# Patient Record
(demographics unavailable — no encounter records)

---

## 2024-12-09 NOTE — PROCEDURE
[FreeTextEntry1] : [facility] [FreeTextEntry2] : lymphedema monitoring.  [FreeTextEntry3] : The patient had a follow up SOZO measurement which I reviewed today. The score is within normal limits, see flowsheet. Bioimpedance spectroscopy helps identify the onset of lymphedema in an arm or leg before patients experience noticeable swelling. Research has shown that the early detection of lymphedema using L-Dex combined with treatment can reduce progression to chronic lymphedema by 95% in breast cancer patients. Whenever possible, patients are tested for baseline L-Dex score before cancer treatment begins and then are reassessed during regular follow-up visits using the SOZO device. Otherwise, this can be started postoperatively and continued during regular follow-up visits. If the patients L-Dex score increases above normal levels, that is a sign that lymphedema is developing and a referral is made to physical therapy for further evaluation and early compression treatment. Lymphedema assessment with the SOZO L-Dex score is recommended to be done every 3 months for the first 3 years and then every 6 months for years 4 and 5 followed by annually afterwards.

## 2024-12-09 NOTE — PHYSICAL EXAM
[Normocephalic] : normocephalic [Atraumatic] : atraumatic [Supple] : supple [No Supraclavicular Adenopathy] : no supraclavicular adenopathy [No Thyromegaly] : no thyromegaly [Examined in the supine and seated position] : examined in the supine and seated position [Bra Size: ___] : Bra Size: [unfilled] [No dominant masses] : no dominant masses in right breast  [No dominant masses] : no dominant masses left breast [No Nipple Retraction] : no left nipple retraction [No Nipple Discharge] : no left nipple discharge [No Axillary Lymphadenopathy] : no left axillary lymphadenopathy [No Edema] : no edema [No Rashes] : no rashes [No Ulceration] : no ulceration [de-identified] :  Bilateral oncoplastic scars healed well, s/p R lumpectomy. R axillary incision healed well.  Upper outer breast lumpiness referred by pt is normal breast tissue and edge of tissue rotated for oncoplastic. No suspicious masses or lesions.

## 2024-12-09 NOTE — ASSESSMENT
[FreeTextEntry1] : PCP: Aida Contreras, DO  Patient is a 66-year-old female, MYRIAD negative, here today for follow up. She is s/p 11/10/23 R lumpectomy and SLNBx and oncoplastics per Dr. 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 [id number] CISH negative. Myriad negative. 11/10/23 Surgical path: R- IDC 10mm, Gr1-2, greater than 2mm from all margins. DCIS 1.5mm from lateral and medial margin, -EIC. ALH. Bx site change. 0/5 LN negative. ER %, [place]+ 71-80%, [id number] CISH negative. *R breast skin tag- intradermal melanocytic nevus. 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 pT1bN0/5 Pt doing well, without breast complaints. Wondering about lumpiness in upper out breast.   Oncotype Dx recurrence score resulted at 6, <1% CT benefit, started Anastrozole after EBRT with Dr. Alicea. Completed EBRT with Dr. Ochoa in 1/18/24. Insurance would not cover Veozah (for hot flashes).  11/5/2024 NFR bilat dMMG: Scattered FG density. No susp mass, microcals, or other signs of malignancy is identified. Stable bilat postsurgical changes. MYRTLE-2. Imp- no mmg evidence of malignancy. Rec-mmg in 1 yr. BR2.  Fhx: No known family history, not AJ.  CBE: C cup, Bilateral oncoplastic incisions healed well, R lumpectomy. R axillary incision healed well. No lymphedema, great range of motion.  Reviewed no suspicious findings on annual smmg (FG) or CBE. Next mmg due 11/25.  Sozo was negative.   PLAN: F.u 6 mos. F/u with Reymundo Ochoa and Nixon as scheduled. Appt with Dr. Seth for exercise oncology.  SOZO today normal.

## 2024-12-09 NOTE — PHYSICAL EXAM
[Normocephalic] : normocephalic [Atraumatic] : atraumatic [Supple] : supple [No Supraclavicular Adenopathy] : no supraclavicular adenopathy [No Thyromegaly] : no thyromegaly [Examined in the supine and seated position] : examined in the supine and seated position [Bra Size: ___] : Bra Size: [unfilled] [No dominant masses] : no dominant masses in right breast  [No dominant masses] : no dominant masses left breast [No Nipple Retraction] : no left nipple retraction [No Nipple Discharge] : no left nipple discharge [No Axillary Lymphadenopathy] : no left axillary lymphadenopathy [No Edema] : no edema [No Rashes] : no rashes [No Ulceration] : no ulceration [de-identified] :  Bilateral oncoplastic scars healed well, s/p R lumpectomy. R axillary incision healed well.  Upper outer breast lumpiness referred by pt is normal breast tissue and edge of tissue rotated for oncoplastic. No suspicious masses or lesions.

## 2024-12-09 NOTE — CONSULT LETTER
[Dear  ___] : Dear  [unfilled], [Courtesy Letter:] : I had the pleasure of seeing your patient, [unfilled], in my office today. [Please see my note below.] : Please see my note below. [Sincerely,] : Sincerely, [FreeTextEntry3] : Maria R Whiting MD

## 2024-12-09 NOTE — DATA REVIEWED
[FreeTextEntry1] : 11/5/2024 NFR bilat dMMG: Scattered FG density. No susp mass, microcals, or other signs of malignancy is identified. Stable bilat postsurgical changes. MYRTLE-2. Imp- no mmg evidence of malignancy. Rec-mmg in 1 yr. BR2.

## 2024-12-09 NOTE — PROCEDURE
[FreeTextEntry1] : [facility] [FreeTextEntry2] : lymphedema monitoring.  [FreeTextEntry3] : The patient had a follow up SOZO measurement which I reviewed today. The score is within normal limits, see flowsheet. Bioimpedance spectroscopy helps identify the onset of lymphedema in an arm or leg before patients experience noticeable swelling. Research has shown that the early detection of lymphedema using L-Dex combined with treatment can reduce progression to chronic lymphedema by 95% in breast cancer patients. Whenever possible, patients are tested for baseline L-Dex score before cancer treatment begins and then are reassessed during regular follow-up visits using the SOZO device. Otherwise, this can be started postoperatively and continued during regular follow-up visits. If the patients L-Dex score increases above normal levels, that is a sign that lymphedema is developing and a referral is made to physical therapy for further evaluation and early compression treatment. Lymphedema assessment with the SOZO L-Dex score is recommended to be done every 3 months for the first 3 years and then every 6 months for years 4 and 5 followed by annually afterwards.

## 2024-12-09 NOTE — PAST MEDICAL HISTORY
[Menarche Age ____] : age at menarche was [unfilled] [Menopause Age____] : age at menopause was [unfilled] [Approximately ___] : the LMP was approximately [unfilled] [Total Preg ___] : G[unfilled] [Living ___] : Living: [unfilled] [Age At Live Birth ___] : Age at live birth: [unfilled] [History of Hormone Replacement Treatment] : has no history of hormone replacement treatment [FreeTextEntry7] : yes until age 28 [FreeTextEntry8] : yes8 months, 6 months, 4 months.

## 2024-12-09 NOTE — HISTORY OF PRESENT ILLNESS
[FreeTextEntry1] : PCP: Adia Contreras DO  Patient is a 66-year-old female, MYRIAD negative, here today for follow up. She is s/p 11/10/23 R lumpectomy and SLNBx and oncoplastics per Dr. 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 [id number] CISH negative. Myriad negative. 11/10/23 Surgical path: R- IDC 10mm, Gr1-2, greater than 2mm from all margins. DCIS 1.5mm from lateral and medial margin, -EIC. ALH. Bx site change. 0/5 LN negative. ER %, [place]+ 71-80%, [id number] CISH negative. *R breast skin tag- intradermal melanocytic nevus. 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 pT1bN0/5 Pt doing well, without breast complaints.   Oncotype Dx recurrence score resulted at 6, <1% CT benefit, started Anastrozole after EBRT with Dr. Alicea. Completed EBRT with Dr. Ochoa in 1/18/24. Insurance would not cover Veozah (for hot flashes).  5/14/24 NFR, bilat dMMG: Scattered FG. No susp mass, microcals or other sign of malignancy is identified. Bilateral postsurgical changes. MYRTLE- grade 2. Impression: no mmg evidence of malignancy. Rec mmg in 1yr. BR2.  11/5/2024 NFR bilat dMMG: Scattered FG density. No susp mass, microcals, or other signs of malignancy is identified. Stable bilat postsurgical changes. MYRTLE-2. Imp- no mmg evidence of malignancy. Rec-mmg in 1 yr. BR2.  Fhx: No known family history, not AJ.

## 2024-12-09 NOTE — ASSESSMENT
[FreeTextEntry1] : PCP: Aida Contreras, DO  Patient is a 66-year-old female, MYRIAD negative, here today for follow up. She is s/p 11/10/23 R lumpectomy and SLNBx and oncoplastics per Dr. 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 [id number] CISH negative. Myriad negative. 11/10/23 Surgical path: R- IDC 10mm, Gr1-2, greater than 2mm from all margins. DCIS 1.5mm from lateral and medial margin, -EIC. ALH. Bx site change. 0/5 LN negative. ER %, NE+ 71-80%, [id number] CISH negative. *R breast skin tag- intradermal melanocytic nevus. 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 pT1bN0/5 Pt doing well, without breast complaints. Wondering about lumpiness in upper out breast.   Oncotype Dx recurrence score resulted at 6, <1% CT benefit, started Anastrozole after EBRT with Dr. Alicea. Completed EBRT with Dr. Ochoa in 1/18/24. Insurance would not cover Veozah (for hot flashes).  11/5/2024 NFR bilat dMMG: Scattered FG density. No susp mass, microcals, or other signs of malignancy is identified. Stable bilat postsurgical changes. MYRTLE-2. Imp- no mmg evidence of malignancy. Rec-mmg in 1 yr. BR2.  Fhx: No known family history, not AJ.  CBE: C cup, Bilateral oncoplastic incisions healed well, R lumpectomy. R axillary incision healed well. No lymphedema, great range of motion.  Reviewed no suspicious findings on annual smmg (FG) or CBE. Next mmg due 11/25.  Sozo was negative.   PLAN: F.u 6 mos. F/u with Reymundo Ochoa and Nixon as scheduled. Appt with Dr. Seth for exercise oncology.  SOZO today normal.

## 2024-12-09 NOTE — HISTORY OF PRESENT ILLNESS
[FreeTextEntry1] : PCP: Aida Contreras DO  Patient is a 66-year-old female, MYRIAD negative, here today for follow up. She is s/p 11/10/23 R lumpectomy and SLNBx and oncoplastics per Dr. 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 [id number] CISH negative. Myriad negative. 11/10/23 Surgical path: R- IDC 10mm, Gr1-2, greater than 2mm from all margins. DCIS 1.5mm from lateral and medial margin, -EIC. ALH. Bx site change. 0/5 LN negative. ER %, [place]+ 71-80%, [id number] CISH negative. *R breast skin tag- intradermal melanocytic nevus. 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 pT1bN0/5 Pt doing well, without breast complaints.   Oncotype Dx recurrence score resulted at 6, <1% CT benefit, started Anastrozole after EBRT with Dr. Alicea. Completed EBRT with Dr. Ochoa in 1/18/24. Insurance would not cover Veozah (for hot flashes).  5/14/24 NFR, bilat dMMG: Scattered FG. No susp mass, microcals or other sign of malignancy is identified. Bilateral postsurgical changes. MYRTLE- grade 2. Impression: no mmg evidence of malignancy. Rec mmg in 1yr. BR2.  11/5/2024 NFR bilat dMMG: Scattered FG density. No susp mass, microcals, or other signs of malignancy is identified. Stable bilat postsurgical changes. MYRTLE-2. Imp- no mmg evidence of malignancy. Rec-mmg in 1 yr. BR2.  Fhx: No known family history, not AJ.

## 2025-03-29 NOTE — END OF VISIT
[Time Spent: ___ minutes] : I have spent [unfilled] minutes of time on the encounter which excludes teaching and separately reported services. 5